# Patient Record
Sex: MALE | URBAN - METROPOLITAN AREA
[De-identification: names, ages, dates, MRNs, and addresses within clinical notes are randomized per-mention and may not be internally consistent; named-entity substitution may affect disease eponyms.]

---

## 2019-06-11 ENCOUNTER — PATIENT OUTREACH (OUTPATIENT)
Dept: CARE COORDINATION | Facility: CLINIC | Age: 24
End: 2019-06-11

## 2019-06-11 DIAGNOSIS — Z65.9 PSYCHOSOCIAL PROBLEM: Primary | ICD-10-CM

## 2023-07-04 ENCOUNTER — NURSE TRIAGE (OUTPATIENT)
Dept: NURSING | Facility: CLINIC | Age: 28
End: 2023-07-04

## 2023-07-05 NOTE — TELEPHONE ENCOUNTER
"Pt's mother Zeinab calling with pt. Pt gave verbal approval to speak with his mother. Zeinab reports pt has left ring finger cut \"1/2 way through with knife\". Occurred approximately 10 minutes ago per Zeinab. Bleeding as stopped per Zeinab. Pt has cleaned wound per Zeinab.    Advised Zeinab and pt to apply direct pressure with clean dressing per protocol. Proceed to nearest ER per protocol.     Zeinab verbalizes understanding and agrees to plan.       Reason for Disposition    Skin is split open or gaping (or length > 1/2 inch or 12 mm)    Additional Information    Negative: [1] Major bleeding (e.g., spurting blood) AND [2] can't be stopped    Negative: Wound looks infected    Negative: Caused by animal bite    Negative: Caused by human bite    Protocols used: FINGER INJURY-A-AH      "